# Patient Record
Sex: MALE | Race: WHITE | NOT HISPANIC OR LATINO | Employment: FULL TIME | URBAN - METROPOLITAN AREA
[De-identification: names, ages, dates, MRNs, and addresses within clinical notes are randomized per-mention and may not be internally consistent; named-entity substitution may affect disease eponyms.]

---

## 2022-11-23 ENCOUNTER — LAB VISIT (OUTPATIENT)
Dept: LAB | Facility: HOSPITAL | Age: 27
End: 2022-11-23
Attending: FAMILY MEDICINE
Payer: COMMERCIAL

## 2022-11-23 DIAGNOSIS — E55.9 AVITAMINOSIS D: ICD-10-CM

## 2022-11-23 DIAGNOSIS — Z00.00 ROUTINE GENERAL MEDICAL EXAMINATION AT A HEALTH CARE FACILITY: Primary | ICD-10-CM

## 2022-11-23 DIAGNOSIS — F51.3 SLEEP WALKING DISORDER: ICD-10-CM

## 2022-11-23 DIAGNOSIS — Z36.3 ENCOUNTER FOR ROUTINE SCREENING FOR MALFORMATION USING ULTRASONICS: ICD-10-CM

## 2022-11-23 LAB
ALBUMIN SERPL-MCNC: 4.2 GM/DL (ref 3.5–5)
ALBUMIN/GLOB SERPL: 1.4 RATIO (ref 1.1–2)
ALP SERPL-CCNC: 64 UNIT/L (ref 40–150)
ALT SERPL-CCNC: 109 UNIT/L (ref 0–55)
AST SERPL-CCNC: 43 UNIT/L (ref 5–34)
BILIRUBIN DIRECT+TOT PNL SERPL-MCNC: 0.5 MG/DL
BUN SERPL-MCNC: 11 MG/DL (ref 8.9–20.6)
CALCIUM SERPL-MCNC: 9.5 MG/DL (ref 8.4–10.2)
CHLORIDE SERPL-SCNC: 105 MMOL/L (ref 98–107)
CHOLEST SERPL-MCNC: 191 MG/DL
CHOLEST/HDLC SERPL: 4 {RATIO} (ref 0–5)
CO2 SERPL-SCNC: 30 MMOL/L (ref 22–29)
CREAT SERPL-MCNC: 0.93 MG/DL (ref 0.73–1.18)
DEPRECATED CALCIDIOL+CALCIFEROL SERPL-MC: 25.2 NG/ML (ref 30–80)
ERYTHROCYTE [DISTWIDTH] IN BLOOD BY AUTOMATED COUNT: 13.1 % (ref 11.5–17)
GFR SERPLBLD CREATININE-BSD FMLA CKD-EPI: >60 MLS/MIN/1.73/M2
GLOBULIN SER-MCNC: 2.9 GM/DL (ref 2.4–3.5)
GLUCOSE SERPL-MCNC: 91 MG/DL (ref 74–100)
HCT VFR BLD AUTO: 46.3 % (ref 42–52)
HDLC SERPL-MCNC: 50 MG/DL (ref 35–60)
HGB BLD-MCNC: 15 GM/DL (ref 14–18)
LDLC SERPL CALC-MCNC: 123 MG/DL (ref 50–140)
MCH RBC QN AUTO: 28.4 PG (ref 27–31)
MCHC RBC AUTO-ENTMCNC: 32.4 MG/DL (ref 33–36)
MCV RBC AUTO: 87.5 FL (ref 80–94)
PLATELET # BLD AUTO: 185 X10(3)/MCL (ref 130–400)
PMV BLD AUTO: 9.3 FL (ref 7.4–10.4)
POTASSIUM SERPL-SCNC: 4.5 MMOL/L (ref 3.5–5.1)
PROT SERPL-MCNC: 7.1 GM/DL (ref 6.4–8.3)
RBC # BLD AUTO: 5.29 X10(6)/MCL (ref 4.7–6.1)
SODIUM SERPL-SCNC: 140 MMOL/L (ref 136–145)
TRIGL SERPL-MCNC: 91 MG/DL (ref 34–140)
TSH SERPL-ACNC: 1.56 UIU/ML (ref 0.35–4.94)
VLDLC SERPL CALC-MCNC: 18 MG/DL
WBC # SPEC AUTO: 7 X10(3)/MCL (ref 4.5–11.5)

## 2022-11-23 PROCEDURE — 82306 VITAMIN D 25 HYDROXY: CPT

## 2022-11-23 PROCEDURE — 80053 COMPREHEN METABOLIC PANEL: CPT

## 2022-11-23 PROCEDURE — 80061 LIPID PANEL: CPT

## 2022-11-23 PROCEDURE — 84443 ASSAY THYROID STIM HORMONE: CPT

## 2022-11-23 PROCEDURE — 85027 COMPLETE CBC AUTOMATED: CPT

## 2022-11-23 PROCEDURE — 36415 COLL VENOUS BLD VENIPUNCTURE: CPT

## 2024-09-25 ENCOUNTER — LAB VISIT (OUTPATIENT)
Dept: LAB | Facility: HOSPITAL | Age: 29
End: 2024-09-25
Attending: FAMILY MEDICINE
Payer: COMMERCIAL

## 2024-09-25 DIAGNOSIS — E55.9 VITAMIN D DEFICIENCY: ICD-10-CM

## 2024-09-25 DIAGNOSIS — Z00.00 ROUTINE GENERAL MEDICAL EXAMINATION AT A HEALTH CARE FACILITY: Primary | ICD-10-CM

## 2024-09-25 LAB
25(OH)D3+25(OH)D2 SERPL-MCNC: 99 NG/ML (ref 30–80)
ALBUMIN SERPL-MCNC: 4.2 G/DL (ref 3.5–5)
ALBUMIN/GLOB SERPL: 1.4 RATIO (ref 1.1–2)
ALP SERPL-CCNC: 53 UNIT/L (ref 40–150)
ALT SERPL-CCNC: 13 UNIT/L (ref 0–55)
ANION GAP SERPL CALC-SCNC: 7 MEQ/L
AST SERPL-CCNC: 17 UNIT/L (ref 5–34)
BILIRUB SERPL-MCNC: 0.6 MG/DL
BUN SERPL-MCNC: 18 MG/DL (ref 8.9–20.6)
CALCIUM SERPL-MCNC: 9.7 MG/DL (ref 8.4–10.2)
CHLORIDE SERPL-SCNC: 106 MMOL/L (ref 98–107)
CHOLEST SERPL-MCNC: 195 MG/DL
CHOLEST/HDLC SERPL: 4 {RATIO} (ref 0–5)
CO2 SERPL-SCNC: 29 MMOL/L (ref 22–29)
CREAT SERPL-MCNC: 0.92 MG/DL (ref 0.73–1.18)
CREAT/UREA NIT SERPL: 20
ERYTHROCYTE [DISTWIDTH] IN BLOOD BY AUTOMATED COUNT: 12.2 % (ref 11.5–17)
GFR SERPLBLD CREATININE-BSD FMLA CKD-EPI: >60 ML/MIN/1.73/M2
GLOBULIN SER-MCNC: 3.1 GM/DL (ref 2.4–3.5)
GLUCOSE SERPL-MCNC: 88 MG/DL (ref 74–100)
HCT VFR BLD AUTO: 47.5 % (ref 42–52)
HDLC SERPL-MCNC: 50 MG/DL (ref 35–60)
HGB BLD-MCNC: 16.2 G/DL (ref 14–18)
LDLC SERPL CALC-MCNC: 123 MG/DL (ref 50–140)
MCH RBC QN AUTO: 29.2 PG (ref 27–31)
MCHC RBC AUTO-ENTMCNC: 34.1 G/DL (ref 33–36)
MCV RBC AUTO: 85.7 FL (ref 80–94)
PLATELET # BLD AUTO: 189 X10(3)/MCL (ref 130–400)
PMV BLD AUTO: 9.9 FL (ref 7.4–10.4)
POTASSIUM SERPL-SCNC: 4.4 MMOL/L (ref 3.5–5.1)
PROT SERPL-MCNC: 7.3 GM/DL (ref 6.4–8.3)
RBC # BLD AUTO: 5.54 X10(6)/MCL (ref 4.7–6.1)
SODIUM SERPL-SCNC: 142 MMOL/L (ref 136–145)
TRIGL SERPL-MCNC: 112 MG/DL (ref 34–140)
TSH SERPL-ACNC: 1.33 UIU/ML (ref 0.35–4.94)
VLDLC SERPL CALC-MCNC: 22 MG/DL
WBC # BLD AUTO: 7.08 X10(3)/MCL (ref 4.5–11.5)

## 2024-09-25 PROCEDURE — 85027 COMPLETE CBC AUTOMATED: CPT

## 2024-09-25 PROCEDURE — 82306 VITAMIN D 25 HYDROXY: CPT

## 2024-09-25 PROCEDURE — 80061 LIPID PANEL: CPT

## 2024-09-25 PROCEDURE — 36415 COLL VENOUS BLD VENIPUNCTURE: CPT

## 2024-09-25 PROCEDURE — 84443 ASSAY THYROID STIM HORMONE: CPT

## 2024-09-25 PROCEDURE — 80053 COMPREHEN METABOLIC PANEL: CPT

## 2025-06-19 ENCOUNTER — OFFICE VISIT (OUTPATIENT)
Dept: OTOLARYNGOLOGY | Facility: CLINIC | Age: 30
End: 2025-06-19
Payer: COMMERCIAL

## 2025-06-19 VITALS
SYSTOLIC BLOOD PRESSURE: 114 MMHG | DIASTOLIC BLOOD PRESSURE: 74 MMHG | TEMPERATURE: 98 F | WEIGHT: 158.38 LBS | HEIGHT: 70 IN | RESPIRATION RATE: 18 BRPM | HEART RATE: 51 BPM | OXYGEN SATURATION: 99 % | BODY MASS INDEX: 22.67 KG/M2

## 2025-06-19 DIAGNOSIS — S02.40FA CLOSED FRACTURE OF LEFT ZYGOMATIC ARCH, INITIAL ENCOUNTER: Primary | ICD-10-CM

## 2025-06-19 PROCEDURE — 99215 OFFICE O/P EST HI 40 MIN: CPT | Mod: PBBFAC | Performed by: STUDENT IN AN ORGANIZED HEALTH CARE EDUCATION/TRAINING PROGRAM

## 2025-06-19 RX ORDER — SODIUM CHLORIDE 9 MG/ML
INJECTION, SOLUTION INTRAVENOUS CONTINUOUS
OUTPATIENT
Start: 2025-06-19

## 2025-06-19 RX ORDER — ASPIRIN 325 MG
5000 TABLET, DELAYED RELEASE (ENTERIC COATED) ORAL
COMMUNITY

## 2025-06-19 RX ORDER — MULTIVITAMIN
TABLET ORAL
COMMUNITY

## 2025-06-19 RX ORDER — HYDROCODONE BITARTRATE AND ACETAMINOPHEN 10; 325 MG/1; MG/1
1 TABLET ORAL EVERY 6 HOURS PRN
COMMUNITY
Start: 2025-06-18

## 2025-06-19 RX ORDER — ONDANSETRON 8 MG/1
8 TABLET, ORALLY DISINTEGRATING ORAL EVERY 8 HOURS
COMMUNITY
Start: 2025-06-18

## 2025-06-19 RX ORDER — AMOXICILLIN AND CLAVULANATE POTASSIUM 875; 125 MG/1; MG/1
1 TABLET, FILM COATED ORAL 2 TIMES DAILY
COMMUNITY
Start: 2025-06-18

## 2025-06-19 RX ORDER — CEFAZOLIN SODIUM 2 G/50ML
2 SOLUTION INTRAVENOUS
OUTPATIENT
Start: 2025-06-19
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
## 2025-06-23 ENCOUNTER — ANESTHESIA EVENT (OUTPATIENT)
Dept: SURGERY | Facility: HOSPITAL | Age: 30
End: 2025-06-23
Payer: COMMERCIAL

## 2025-06-25 ENCOUNTER — PATIENT MESSAGE (OUTPATIENT)
Dept: SURGERY | Facility: HOSPITAL | Age: 30
End: 2025-06-25
Payer: COMMERCIAL

## 2025-06-25 NOTE — ANESTHESIA PREPROCEDURE EVALUATION
06/25/2025  J Luis Finn is a 29 y.o., male. OPEN TREATMENT, FRACTURE, DEPRESSED, MALAR REGION OR ZYGOMATIC ARCH (Left)     Vitals:    06/26/25 0924 06/26/25 0932 06/26/25 1059   BP:  113/71 122/82   BP Location:  Left arm    Patient Position:  Lying    Pulse:  63 68   Resp:  18 20   Temp:  36.5 °C (97.7 °F) 36.1 °C (97 °F)   TempSrc:  Oral Temporal   SpO2:  100% 100%   Weight: 69.9 kg (154 lb 3.2 oz)             Active Ambulatory Problems     Diagnosis Date Noted    No Active Ambulatory Problems     Resolved Ambulatory Problems     Diagnosis Date Noted    No Resolved Ambulatory Problems     No Additional Past Medical History       Past Surgical History:   Procedure Laterality Date    WISDOM TOOTH EXTRACTION Bilateral        Lab Results   Component Value Date    WBC 7.08 09/25/2024    HGB 16.2 09/25/2024    HCT 47.5 09/25/2024     09/25/2024    CHOL 195 09/25/2024    TRIG 112 09/25/2024    HDL 50 09/25/2024    ALT 13 09/25/2024    AST 17 09/25/2024     09/25/2024    K 4.4 09/25/2024     09/25/2024    CREATININE 0.92 09/25/2024    BUN 18.0 09/25/2024    CO2 29 09/25/2024    TSH 1.332 09/25/2024       Pre-op Assessment    I have reviewed the Patient Summary Reports.     I have reviewed the Nursing Notes. I have reviewed the NPO Status.   I have reviewed the Medications.     Review of Systems  Anesthesia Hx:  No problems with previous Anesthesia   History of prior surgery of interest to airway management or planning:          Denies Family Hx of Anesthesia complications.    Denies Personal Hx of Anesthesia complications.                    Hematology/Oncology:  Hematology Normal   Oncology Normal                                   EENT/Dental:  EENT/Dental Normal           Cardiovascular:  Cardiovascular Normal                                              Pulmonary:  Pulmonary Normal                        Renal/:  Renal/ Normal                 Hepatic/GI:  Hepatic/GI Normal                    Musculoskeletal:  Musculoskeletal Normal                Neurological:  Neurology Normal                                      Endocrine:  Endocrine Normal            Dermatological:  Skin Normal    Psych:  Psychiatric Normal                    Physical Exam  General: Well nourished, Cooperative, Alert and Oriented    Airway:  Mallampati: I / I  Mouth Opening: Normal  TM Distance: Normal  Tongue: Normal  Neck ROM: Normal ROM    Dental:  Intact        Anesthesia Plan  Type of Anesthesia, risks & benefits discussed:    Anesthesia Type: Gen ETT  Intra-op Monitoring Plan: Standard ASA Monitors  Post Op Pain Control Plan: multimodal analgesia and IV/PO Opioids PRN  Induction:  IV  Airway Plan: Direct  Informed Consent: Informed consent signed with the Patient and all parties understand the risks and agree with anesthesia plan.  All questions answered. Patient consented to blood products? No  ASA Score: 1  Day of Surgery Review of History & Physical: H&P Update referred to the surgeon/provider.    Ready For Surgery From Anesthesia Perspective.     .

## 2025-06-26 ENCOUNTER — HOSPITAL ENCOUNTER (OUTPATIENT)
Facility: HOSPITAL | Age: 30
Discharge: HOME | End: 2025-06-26
Attending: OTOLARYNGOLOGY | Admitting: OTOLARYNGOLOGY
Payer: COMMERCIAL

## 2025-06-26 VITALS
HEART RATE: 62 BPM | RESPIRATION RATE: 19 BRPM | OXYGEN SATURATION: 98 % | WEIGHT: 154.19 LBS | BODY MASS INDEX: 22.13 KG/M2 | TEMPERATURE: 98 F | DIASTOLIC BLOOD PRESSURE: 77 MMHG | SYSTOLIC BLOOD PRESSURE: 121 MMHG

## 2025-06-26 DIAGNOSIS — S02.40FA CLOSED FRACTURE OF LEFT ZYGOMATIC ARCH, INITIAL ENCOUNTER: Primary | ICD-10-CM

## 2025-06-26 PROCEDURE — 36000710: Performed by: OTOLARYNGOLOGY

## 2025-06-26 PROCEDURE — 25000003 PHARM REV CODE 250

## 2025-06-26 PROCEDURE — 36000711: Performed by: OTOLARYNGOLOGY

## 2025-06-26 PROCEDURE — 71000033 HC RECOVERY, INTIAL HOUR: Performed by: OTOLARYNGOLOGY

## 2025-06-26 PROCEDURE — 25000003 PHARM REV CODE 250: Performed by: SPECIALIST

## 2025-06-26 PROCEDURE — 71000016 HC POSTOP RECOV ADDL HR: Performed by: OTOLARYNGOLOGY

## 2025-06-26 PROCEDURE — 63600175 PHARM REV CODE 636 W HCPCS: Performed by: OTOLARYNGOLOGY

## 2025-06-26 PROCEDURE — 71000015 HC POSTOP RECOV 1ST HR: Performed by: OTOLARYNGOLOGY

## 2025-06-26 PROCEDURE — 25000003 PHARM REV CODE 250: Performed by: OTOLARYNGOLOGY

## 2025-06-26 PROCEDURE — 63600175 PHARM REV CODE 636 W HCPCS: Performed by: SPECIALIST

## 2025-06-26 PROCEDURE — 37000008 HC ANESTHESIA 1ST 15 MINUTES: Performed by: OTOLARYNGOLOGY

## 2025-06-26 PROCEDURE — 63600175 PHARM REV CODE 636 W HCPCS

## 2025-06-26 PROCEDURE — 63600175 PHARM REV CODE 636 W HCPCS: Performed by: STUDENT IN AN ORGANIZED HEALTH CARE EDUCATION/TRAINING PROGRAM

## 2025-06-26 PROCEDURE — 37000009 HC ANESTHESIA EA ADD 15 MINS: Performed by: OTOLARYNGOLOGY

## 2025-06-26 RX ORDER — SODIUM CHLORIDE, SODIUM LACTATE, POTASSIUM CHLORIDE, CALCIUM CHLORIDE 600; 310; 30; 20 MG/100ML; MG/100ML; MG/100ML; MG/100ML
INJECTION, SOLUTION INTRAVENOUS CONTINUOUS
Status: DISCONTINUED | OUTPATIENT
Start: 2025-06-26 | End: 2025-06-26 | Stop reason: HOSPADM

## 2025-06-26 RX ORDER — SODIUM CHLORIDE 9 MG/ML
INJECTION, SOLUTION INTRAVENOUS CONTINUOUS
Status: DISCONTINUED | OUTPATIENT
Start: 2025-06-26 | End: 2025-06-26 | Stop reason: HOSPADM

## 2025-06-26 RX ORDER — MEPERIDINE HYDROCHLORIDE 25 MG/ML
12.5 INJECTION INTRAMUSCULAR; INTRAVENOUS; SUBCUTANEOUS ONCE
Status: DISCONTINUED | OUTPATIENT
Start: 2025-06-26 | End: 2025-06-26 | Stop reason: HOSPADM

## 2025-06-26 RX ORDER — PROPOFOL 10 MG/ML
VIAL (ML) INTRAVENOUS
Status: DISCONTINUED | OUTPATIENT
Start: 2025-06-26 | End: 2025-06-26

## 2025-06-26 RX ORDER — PROCHLORPERAZINE EDISYLATE 5 MG/ML
5 INJECTION INTRAMUSCULAR; INTRAVENOUS ONCE AS NEEDED
Status: DISCONTINUED | OUTPATIENT
Start: 2025-06-26 | End: 2025-06-26 | Stop reason: HOSPADM

## 2025-06-26 RX ORDER — FENTANYL CITRATE 50 UG/ML
INJECTION, SOLUTION INTRAMUSCULAR; INTRAVENOUS
Status: DISCONTINUED | OUTPATIENT
Start: 2025-06-26 | End: 2025-06-26

## 2025-06-26 RX ORDER — MIDAZOLAM HYDROCHLORIDE 2 MG/2ML
2 INJECTION, SOLUTION INTRAMUSCULAR; INTRAVENOUS ONCE
Status: COMPLETED | OUTPATIENT
Start: 2025-06-26 | End: 2025-06-26

## 2025-06-26 RX ORDER — DEXAMETHASONE SODIUM PHOSPHATE 4 MG/ML
INJECTION, SOLUTION INTRA-ARTICULAR; INTRALESIONAL; INTRAMUSCULAR; INTRAVENOUS; SOFT TISSUE
Status: DISCONTINUED | OUTPATIENT
Start: 2025-06-26 | End: 2025-06-26

## 2025-06-26 RX ORDER — LIDOCAINE HYDROCHLORIDE 20 MG/ML
INJECTION INTRAVENOUS
Status: DISCONTINUED | OUTPATIENT
Start: 2025-06-26 | End: 2025-06-26

## 2025-06-26 RX ORDER — GLUCAGON 1 MG
1 KIT INJECTION
Status: DISCONTINUED | OUTPATIENT
Start: 2025-06-26 | End: 2025-06-26 | Stop reason: HOSPADM

## 2025-06-26 RX ORDER — HYDROMORPHONE HYDROCHLORIDE 1 MG/ML
0.5 INJECTION, SOLUTION INTRAMUSCULAR; INTRAVENOUS; SUBCUTANEOUS EVERY 5 MIN PRN
Status: DISCONTINUED | OUTPATIENT
Start: 2025-06-26 | End: 2025-06-26 | Stop reason: HOSPADM

## 2025-06-26 RX ORDER — HYDROMORPHONE HYDROCHLORIDE 1 MG/ML
0.2 INJECTION, SOLUTION INTRAMUSCULAR; INTRAVENOUS; SUBCUTANEOUS EVERY 5 MIN PRN
Status: DISCONTINUED | OUTPATIENT
Start: 2025-06-26 | End: 2025-06-26 | Stop reason: HOSPADM

## 2025-06-26 RX ORDER — IPRATROPIUM BROMIDE AND ALBUTEROL SULFATE 2.5; .5 MG/3ML; MG/3ML
3 SOLUTION RESPIRATORY (INHALATION) ONCE AS NEEDED
Status: DISCONTINUED | OUTPATIENT
Start: 2025-06-26 | End: 2025-06-26 | Stop reason: HOSPADM

## 2025-06-26 RX ORDER — ONDANSETRON HYDROCHLORIDE 2 MG/ML
4 INJECTION, SOLUTION INTRAVENOUS ONCE
Status: COMPLETED | OUTPATIENT
Start: 2025-06-26 | End: 2025-06-26

## 2025-06-26 RX ORDER — ROCURONIUM BROMIDE 10 MG/ML
INJECTION, SOLUTION INTRAVENOUS
Status: DISCONTINUED | OUTPATIENT
Start: 2025-06-26 | End: 2025-06-26

## 2025-06-26 RX ORDER — DIPHENHYDRAMINE HYDROCHLORIDE 50 MG/ML
25 INJECTION, SOLUTION INTRAMUSCULAR; INTRAVENOUS ONCE AS NEEDED
Status: DISCONTINUED | OUTPATIENT
Start: 2025-06-26 | End: 2025-06-26 | Stop reason: HOSPADM

## 2025-06-26 RX ORDER — BACITRACIN 500 [USP'U]/G
OINTMENT TOPICAL
Status: DISCONTINUED | OUTPATIENT
Start: 2025-06-26 | End: 2025-06-26 | Stop reason: HOSPADM

## 2025-06-26 RX ORDER — LIDOCAINE HYDROCHLORIDE AND EPINEPHRINE 10; 10 UG/ML; MG/ML
INJECTION, SOLUTION INFILTRATION; PERINEURAL
Status: DISCONTINUED | OUTPATIENT
Start: 2025-06-26 | End: 2025-06-26 | Stop reason: HOSPADM

## 2025-06-26 RX ORDER — OXYCODONE HYDROCHLORIDE 5 MG/1
5 TABLET ORAL EVERY 4 HOURS PRN
Qty: 20 TABLET | Refills: 0 | Status: SHIPPED | OUTPATIENT
Start: 2025-06-26

## 2025-06-26 RX ORDER — OXYCODONE AND ACETAMINOPHEN 5; 325 MG/1; MG/1
2 TABLET ORAL ONCE
Status: COMPLETED | OUTPATIENT
Start: 2025-06-26 | End: 2025-06-26

## 2025-06-26 RX ORDER — CEFAZOLIN SODIUM 1 G/3ML
2 INJECTION, POWDER, FOR SOLUTION INTRAMUSCULAR; INTRAVENOUS
Status: COMPLETED | OUTPATIENT
Start: 2025-06-26 | End: 2025-06-26

## 2025-06-26 RX ORDER — CHLORHEXIDINE GLUCONATE ORAL RINSE 1.2 MG/ML
SOLUTION DENTAL
Qty: 473 ML | Refills: 0 | Status: SHIPPED | OUTPATIENT
Start: 2025-06-26

## 2025-06-26 RX ORDER — KETOROLAC TROMETHAMINE 30 MG/ML
INJECTION, SOLUTION INTRAMUSCULAR; INTRAVENOUS
Status: DISCONTINUED | OUTPATIENT
Start: 2025-06-26 | End: 2025-06-26

## 2025-06-26 RX ADMIN — LIDOCAINE HYDROCHLORIDE 80 MG: 20 INJECTION INTRAVENOUS at 11:06

## 2025-06-26 RX ADMIN — OXYCODONE HYDROCHLORIDE AND ACETAMINOPHEN 2 TABLET: 5; 325 TABLET ORAL at 02:06

## 2025-06-26 RX ADMIN — KETOROLAC TROMETHAMINE 30 MG: 30 INJECTION INTRAMUSCULAR; INTRAVENOUS at 12:06

## 2025-06-26 RX ADMIN — ROCURONIUM BROMIDE 50 MG: 10 INJECTION INTRAVENOUS at 11:06

## 2025-06-26 RX ADMIN — PROPOFOL 200 MG: 10 INJECTION, EMULSION INTRAVENOUS at 11:06

## 2025-06-26 RX ADMIN — SUGAMMADEX 200 MG: 100 INJECTION, SOLUTION INTRAVENOUS at 12:06

## 2025-06-26 RX ADMIN — FENTANYL CITRATE 50 MCG: 50 INJECTION INTRAMUSCULAR; INTRAVENOUS at 11:06

## 2025-06-26 RX ADMIN — FENTANYL CITRATE 50 MCG: 50 INJECTION INTRAMUSCULAR; INTRAVENOUS at 01:06

## 2025-06-26 RX ADMIN — MIDAZOLAM HYDROCHLORIDE 2 MG: 1 INJECTION, SOLUTION INTRAMUSCULAR; INTRAVENOUS at 11:06

## 2025-06-26 RX ADMIN — SODIUM CHLORIDE, POTASSIUM CHLORIDE, SODIUM LACTATE AND CALCIUM CHLORIDE: 600; 310; 30; 20 INJECTION, SOLUTION INTRAVENOUS at 11:06

## 2025-06-26 RX ADMIN — ONDANSETRON 4 MG: 2 INJECTION INTRAMUSCULAR; INTRAVENOUS at 12:06

## 2025-06-26 RX ADMIN — CEFAZOLIN 2 G: 330 INJECTION, POWDER, FOR SOLUTION INTRAMUSCULAR; INTRAVENOUS at 11:06

## 2025-06-26 RX ADMIN — DEXAMETHASONE SODIUM PHOSPHATE 8 MG: 4 INJECTION, SOLUTION INTRA-ARTICULAR; INTRALESIONAL; INTRAMUSCULAR; INTRAVENOUS; SOFT TISSUE at 11:06

## 2025-06-26 NOTE — BRIEF OP NOTE
[facility] - Periop Services  Brief Operative Note    Surgery Date: 6/26/2025     Surgeons and Role:     * Trevor Fleming MD - Primary    Assisting Surgeon: Ying Sinclair MD PGY-4    Pre-op Diagnosis:  * No pre-op diagnosis entered *    Post-op Diagnosis:  Post-Op Diagnosis Codes:     * Closed fracture of left zygomatic arch, initial encounter [S02.40FA]    Procedure(s) (LRB):  OPEN TREATMENT, FRACTURE, DEPRESSED, MALAR REGION OR ZYGOMATIC ARCH (Left)    Anesthesia: General    Operative Findings: depressed zygomatic arch fracture, stable ZMC    Estimated Blood Loss: 5cc         Specimens:   Specimen (24h ago, onward)      None                  Discharge Note    OUTCOME: Patient tolerated treatment/procedure well without complication and is now ready for discharge.    DISPOSITION: Home or Self Care    FINAL DIAGNOSIS:  Closed fracture of left zygomatic arch    FOLLOWUP: In clinic    DISCHARGE INSTRUCTIONS:    Discharge Procedure Orders   Diet Dysphagia Mechanical Soft   Order Comments: Soft diet x2 weeks     Activity as tolerated

## 2025-06-26 NOTE — TRANSFER OF CARE
Anesthesia Transfer of Care Note    Patient: J Luis Finn    Procedure(s) Performed: Procedure(s) (LRB):  OPEN TREATMENT, FRACTURE, DEPRESSED, MALAR REGION OR ZYGOMATIC ARCH (Left)    Patient location: PACU    Anesthesia Type: general    Transport from OR: Transported from OR on room air with adequate spontaneous ventilation    Post pain: adequate analgesia    Post assessment: no apparent anesthetic complications    Post vital signs: stable    Level of consciousness: awake    Nausea/Vomiting: no nausea/vomiting    Complications: none    Transfer of care protocol was followed      Last vitals: Visit Vitals  /82   Pulse 68   Temp 36.1 °C (97 °F) (Temporal)   Resp 20   Wt 69.9 kg (154 lb 3.2 oz)   SpO2 100%   BMI 22.13 kg/m²

## 2025-06-26 NOTE — INTERVAL H&P NOTE
The patient has been examined and the H&P has been reviewed:    I concur with the findings and no changes have occurred since H&P was written.    Surgery risks, benefits and alternative options discussed and understood by patient/family.    [place] [facility] [place]    Ying Sinclair MD  [facility] - Head & Neck Surgery

## 2025-06-26 NOTE — DISCHARGE INSTRUCTIONS
Sutures are inside of the mouth and are dissolvable.  No lifting of anything heavier than 10 pounds for 2 weeks.  No blowing nose.  Patient should stick to a soft diet for at least two weeks.

## 2025-06-26 NOTE — OP NOTE
OPEN TREATMENT, FRACTURE, DEPRESSED, MALAR REGION OR ZYGOMATIC ARCH Op Note    Date: 6/26/2025    Attending Surgeon(s): Surgeons and Role:     * Trevor Fleming MD - Primary    Resident Surgeon(s): Ying Sinclair MD PGY-4    Anesthesia: General      Procedure performed:   Open reduction zygomatic arch fracture - left    Findings:  Depressed zygomatic arch fracture  Stable zygomatic complex fracture    Pre-operative Diagnosis:   Zygomatic complex fracture - left    Post-operative Diagnosis:   Same     Indication for Procedure:   J Luis Finn is a 29 y.o. male who presented with zygomatic complex fracture with depressed zygomatic arch.  The risks and benefits of open reduction and internal fixation were explained. Informed consent obtained. The patient agreed to proceed.    Procedure Details   The patient was brought to the operating room and laid supine on the operating room table. General endotracheal anesthesia was induced. 4cc of 1% lidocaine with epinephrine were injected to the left maxillary gingivobuccal sulcus. The site was prepped with baby shampoo and the mouth was prepped with hydrogen peroxide and draped in the usual sterile fashion. A time-out was performed to confirm the correct procedure and site.    An incision was made over the left maxillary gingivobuccal sulcus and carried down to the bone. The periosteum was elevated using a #9 elevator. The medial buttress fracture was exposed and noted to be incomplete and stable. The zygomatic arch was palpated and noted to have a stepoff and depression. A dale elevator was inserted via a Keen's approach under the arch and the arch was reduced laterally and superiorly back into place. This was confirmed with palpation. The zygomaticofrontal suture was noted to be intact without significant stepoff.    The wound was then copiously irrigated with normal saline. The incision was closed deeply and superficially using 3-0 vicryl. The mouth was again  cleansed with hydrogen peroxide.    Care was then returned to anesthesia for emergence. The patient tolerated the procedure well with no immediate complication. Attending surgeon Dr. Fleming was present and scrubbed for the entirety of the procedure.    Estimated Blood Loss: 5cc      Drains: none           Specimens: * No specimens in log *    Implants: none    Complications:  none           Disposition: PACU - hemodynamically stable.           Condition: stable    Ying Sinclair MD  [facility] PGY-3

## 2025-06-26 NOTE — ANESTHESIA PROCEDURE NOTES
Intubation    Date/Time: 6/26/2025 11:45 AM    Performed by: Katelin Askew CRNA  Authorized by: Cristina Hopper MD    Intubation:     Induction:  Intravenous    Intubated:  Postinduction    Mask Ventilation:  Easy mask    Attempts:  1    Attempted By:  CRNA    Method of Intubation:  Direct    Blade:  Haque 2    Laryngeal View Grade: Grade I - full view of cords      Difficult Airway Encountered?: No      Complications:  None    Airway Device:  Oral endotracheal tube    Airway Device Size:  7.5    Style/Cuff Inflation:  Cuffed (inflated to minimal occlusive pressure)    Tube secured:  23    Secured at:  The lips    Placement Verified By:  Capnometry    Complicating Factors:  None    Findings Post-Intubation:  BS equal bilateral and atraumatic/condition of teeth unchanged

## 2025-06-27 ENCOUNTER — ANESTHESIA (OUTPATIENT)
Dept: SURGERY | Facility: HOSPITAL | Age: 30
End: 2025-06-27
Payer: COMMERCIAL

## 2025-06-27 NOTE — ANESTHESIA POSTPROCEDURE EVALUATION
Anesthesia Post Evaluation    Patient: J Luis Finn    Procedure(s) Performed: Procedure(s) (LRB):  OPEN TREATMENT, FRACTURE, DEPRESSED, MALAR REGION OR ZYGOMATIC ARCH (Left)    Final Anesthesia Type: general      Patient location during evaluation: PACU  Patient participation: Yes- Able to Participate  Level of consciousness: awake and responds to stimulation  Post-procedure vital signs: reviewed and stable  Pain management: adequate  Airway patency: patent    PONV status at discharge: No PONV  Anesthetic complications: no      Cardiovascular status: blood pressure returned to baseline  Respiratory status: unassisted  Hydration status: euvolemic  Follow-up not needed.          Vitals Value Taken Time   /77 06/26/25 15:00   Temp 36.6 °C (97.9 °F) 06/26/25 15:00   Pulse 65 06/26/25 14:58   Resp 19 06/26/25 15:00   SpO2 100 % 06/26/25 14:58   Vitals shown include unfiled device data.      Event Time   Out of Recovery 13:38:00         Pain/Yaya Score: Pain Rating Prior to Med Admin: 6 (6/26/2025  2:20 PM)  Pain Rating Post Med Admin: 4 (6/26/2025  2:47 PM)  Yaya Score: 9 (6/26/2025  1:40 PM)  Modified Yaya Score: 19 (6/26/2025  3:00 PM)

## 2025-07-02 ENCOUNTER — OFFICE VISIT (OUTPATIENT)
Dept: OTOLARYNGOLOGY | Facility: CLINIC | Age: 30
End: 2025-07-02
Payer: COMMERCIAL

## 2025-07-02 VITALS
SYSTOLIC BLOOD PRESSURE: 97 MMHG | OXYGEN SATURATION: 99 % | HEART RATE: 73 BPM | BODY MASS INDEX: 22.06 KG/M2 | DIASTOLIC BLOOD PRESSURE: 67 MMHG | RESPIRATION RATE: 18 BRPM | TEMPERATURE: 98 F | WEIGHT: 154.13 LBS | HEIGHT: 70 IN

## 2025-07-02 DIAGNOSIS — S02.40FA CLOSED FRACTURE OF LEFT ZYGOMATIC ARCH, INITIAL ENCOUNTER: Primary | ICD-10-CM

## 2025-07-02 PROCEDURE — 99213 OFFICE O/P EST LOW 20 MIN: CPT | Mod: PBBFAC | Performed by: OTOLARYNGOLOGY

## 2025-07-02 NOTE — PROGRESS NOTES
I have reviewed and agree with the resident's findings, including all diagnostic interpretations and plans as written.     Trevor Fleming M.D.
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
## 2025-07-15 ENCOUNTER — OFFICE VISIT (OUTPATIENT)
Dept: OTOLARYNGOLOGY | Facility: CLINIC | Age: 30
End: 2025-07-15
Payer: COMMERCIAL

## 2025-07-15 VITALS
HEART RATE: 69 BPM | OXYGEN SATURATION: 98 % | HEIGHT: 70 IN | BODY MASS INDEX: 22.05 KG/M2 | TEMPERATURE: 98 F | SYSTOLIC BLOOD PRESSURE: 110 MMHG | RESPIRATION RATE: 20 BRPM | WEIGHT: 154 LBS | DIASTOLIC BLOOD PRESSURE: 70 MMHG

## 2025-07-15 DIAGNOSIS — S02.40FA CLOSED FRACTURE OF LEFT ZYGOMATIC ARCH, INITIAL ENCOUNTER: Primary | ICD-10-CM

## 2025-07-15 PROCEDURE — 99213 OFFICE O/P EST LOW 20 MIN: CPT | Mod: PBBFAC
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
## (undated) DEVICE — PENCIL ROCKER SWITCH 10FT CORD

## (undated) DEVICE — CONTAINER SPECIMEN OR STER 4OZ

## (undated) DEVICE — PAD ELECTROSURGICAL SPL W/CORD

## (undated) DEVICE — SYR 10CC LUER LOCK

## (undated) DEVICE — MARKER WRITESITE SKIN CHLRAPRP

## (undated) DEVICE — PROTECTOR CORNEAL CROUCH ST

## (undated) DEVICE — CORD CAUTERY BIPOLAR STERILE

## (undated) DEVICE — SUT SILK 4-0 BLK 18IN PS-2

## (undated) DEVICE — NDL N SERIES MICRO-DISSECTION

## (undated) DEVICE — GOWN POLY REINF BRTH SLV XL

## (undated) DEVICE — GLOVE SIGNATURE MICRO LTX 6

## (undated) DEVICE — SOLIDIFIER BOTTLE 1500CC

## (undated) DEVICE — SOL NACL IRR 1000ML BTL

## (undated) DEVICE — STAPLER SKIN ROTATING HEAD

## (undated) DEVICE — GLOVE SENSICARE PI GRN 6.5

## (undated) DEVICE — STAPLER SKIN COUNT 35 W STPL

## (undated) DEVICE — CORD BIPOLAR 12 FOOT

## (undated) DEVICE — APPLICATOR STRL COT 2INNR 6IN

## (undated) DEVICE — DRAPE INSTR MAGNETIC 10X16IN

## (undated) DEVICE — GLOVE SIGNATURE MICRO LTX 7.5

## (undated) DEVICE — GLOVE SIGNATURE MICRO LTX 5.5

## (undated) DEVICE — COVER PROXIMA MAYO STAND

## (undated) DEVICE — MANIFOLD 4 PORT

## (undated) DEVICE — Device

## (undated) DEVICE — NDL ECLIPSE SAFETY 18GX1-1/2IN

## (undated) DEVICE — KIT SURGICAL TURNOVER

## (undated) DEVICE — GLOVE SENSICARE PI GRN 6

## (undated) DEVICE — GLOVE SENSICARE NEOPRENE 6.5

## (undated) DEVICE — NDL 27G X 1 1/4